# Patient Record
Sex: MALE | Race: WHITE | NOT HISPANIC OR LATINO | URBAN - METROPOLITAN AREA
[De-identification: names, ages, dates, MRNs, and addresses within clinical notes are randomized per-mention and may not be internally consistent; named-entity substitution may affect disease eponyms.]

---
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
## 2022-08-25 NOTE — ED PEDIATRIC TRIAGE NOTE - NS ED TRIAGE HISTORIAN
Prescription sent to pharmacy
[facility] faxed refill request for the following medications:        Pantoprazole SOD DR 40 MG Tab  QTY: 90  Refills: 1  Take 1 tablet by mouth every day          LOV 5-  NOV   9-7-2022
EMS/Father/Patient

## 2024-04-09 NOTE — ED BEHAVIORAL HEALTH ASSESSMENT NOTE - BODY HABITUS
Patient daily assessment. Patient informed on his plan of care and medications that are given using the ; patient speaks and understands some english. Patient informed that he does have bathroom privileges. Patient has telemetry monitor in place with reading of sinus rhythm. Seizure precautions are in place, no seizure activity noted. Patient does not show any signs of alcohol withdrawals throughout the day. Patient is awake and alert X 4, no distress noted upon assessment. Patient complained of abdominal pain 5/10; PRN pain medication has been given, patient is currently resting comfortably in bed at this time. Patient understands his plan of care and medications that are given. Will continue to monitor throughout shift, keep safe and informed on his plan of care, monitor and manage pain   Well nourished